# Patient Record
Sex: OTHER/UNKNOWN | Race: WHITE
[De-identification: names, ages, dates, MRNs, and addresses within clinical notes are randomized per-mention and may not be internally consistent; named-entity substitution may affect disease eponyms.]

---

## 2019-12-03 ENCOUNTER — HOSPITAL ENCOUNTER (EMERGENCY)
Dept: HOSPITAL 8 - ED | Age: 44
Discharge: HOME | End: 2019-12-03
Payer: MEDICAID

## 2019-12-03 VITALS — WEIGHT: 124.12 LBS | BODY MASS INDEX: 19.48 KG/M2 | HEIGHT: 67 IN

## 2019-12-03 VITALS — DIASTOLIC BLOOD PRESSURE: 76 MMHG | SYSTOLIC BLOOD PRESSURE: 107 MMHG

## 2019-12-03 DIAGNOSIS — R25.2: ICD-10-CM

## 2019-12-03 DIAGNOSIS — M54.6: Primary | ICD-10-CM

## 2019-12-03 PROCEDURE — 96372 THER/PROPH/DIAG INJ SC/IM: CPT

## 2019-12-03 PROCEDURE — 99283 EMERGENCY DEPT VISIT LOW MDM: CPT

## 2019-12-03 PROCEDURE — 72072 X-RAY EXAM THORAC SPINE 3VWS: CPT

## 2019-12-03 PROCEDURE — 93005 ELECTROCARDIOGRAM TRACING: CPT
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
## 2019-12-22 ENCOUNTER — HOSPITAL ENCOUNTER (EMERGENCY)
Dept: HOSPITAL 8 - ED | Age: 44
Discharge: HOME | End: 2019-12-22
Payer: MEDICAID

## 2019-12-22 VITALS — HEIGHT: 67 IN | WEIGHT: 119.05 LBS | BODY MASS INDEX: 18.69 KG/M2

## 2019-12-22 VITALS — DIASTOLIC BLOOD PRESSURE: 77 MMHG | SYSTOLIC BLOOD PRESSURE: 124 MMHG

## 2019-12-22 DIAGNOSIS — R07.89: Primary | ICD-10-CM

## 2019-12-22 DIAGNOSIS — G43.909: ICD-10-CM

## 2019-12-22 PROCEDURE — 99283 EMERGENCY DEPT VISIT LOW MDM: CPT

## 2019-12-22 PROCEDURE — 71120 X-RAY EXAM BREASTBONE 2/>VWS: CPT
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
